# Patient Record
Sex: FEMALE | Race: WHITE | NOT HISPANIC OR LATINO | Employment: UNEMPLOYED | URBAN - METROPOLITAN AREA
[De-identification: names, ages, dates, MRNs, and addresses within clinical notes are randomized per-mention and may not be internally consistent; named-entity substitution may affect disease eponyms.]

---

## 2017-10-05 ENCOUNTER — OFFICE VISIT (OUTPATIENT)
Dept: PHYSICAL MEDICINE AND REHAB | Facility: CLINIC | Age: 42
End: 2017-10-05
Payer: COMMERCIAL

## 2017-10-05 ENCOUNTER — HOSPITAL ENCOUNTER (OUTPATIENT)
Dept: RADIOLOGY | Facility: HOSPITAL | Age: 42
Discharge: HOME | End: 2017-10-05
Attending: PHYSICAL MEDICINE & REHABILITATION
Payer: COMMERCIAL

## 2017-10-05 VITALS
WEIGHT: 169.75 LBS | SYSTOLIC BLOOD PRESSURE: 114 MMHG | HEIGHT: 62 IN | DIASTOLIC BLOOD PRESSURE: 79 MMHG | HEART RATE: 75 BPM | BODY MASS INDEX: 31.24 KG/M2

## 2017-10-05 DIAGNOSIS — M25.571 RIGHT ANKLE PAIN, UNSPECIFIED CHRONICITY: Primary | ICD-10-CM

## 2017-10-05 DIAGNOSIS — M25.571 RIGHT ANKLE PAIN, UNSPECIFIED CHRONICITY: ICD-10-CM

## 2017-10-05 DIAGNOSIS — G89.29 CHRONIC PAIN OF RIGHT ANKLE: Primary | ICD-10-CM

## 2017-10-05 DIAGNOSIS — M25.371 RIGHT ANKLE INSTABILITY: ICD-10-CM

## 2017-10-05 DIAGNOSIS — S93.401S SPRAIN OF RIGHT ANKLE, UNSPECIFIED LIGAMENT, SEQUELA: ICD-10-CM

## 2017-10-05 DIAGNOSIS — M25.571 CHRONIC PAIN OF RIGHT ANKLE: Primary | ICD-10-CM

## 2017-10-05 PROCEDURE — 73610 X-RAY EXAM OF ANKLE: CPT | Mod: TC,PO,RT

## 2017-10-05 PROCEDURE — 99203 OFFICE O/P NEW LOW 30 MIN: CPT | Mod: S$GLB,,, | Performed by: PHYSICAL MEDICINE & REHABILITATION

## 2017-10-05 PROCEDURE — 99999 PR PBB SHADOW E&M-EST. PATIENT-LVL III: CPT | Mod: PBBFAC,,, | Performed by: PHYSICAL MEDICINE & REHABILITATION

## 2017-10-05 PROCEDURE — 73610 X-RAY EXAM OF ANKLE: CPT | Mod: 26,RT,, | Performed by: RADIOLOGY

## 2017-10-05 NOTE — PROGRESS NOTES
"[facility]    CHIEF COMPLAINT:   Chief Complaint   Patient presents with    Ankle Pain     right ankle pain     HISTORY OF PRESENT ILLNESS: Marin Gomes is a 42 y.o. female who presents for evaluation of right ankle pain. She reports stepping on an uneven surface on September 13, 2017 and "spraining" her right ankle. She had pain along the lateral aspect and swelling. She continues to have mild pain on the lateral aspect of the ankle primarily with walking or wearing high heels. The ankle continues to feel unsteady and she is afraid it will give out. The right ankle is still somewhat swollen. Overall, she does feel like her ankle pain is improving. She has no pain at rest. Of note, she had a similar episode about 10 years ago which resolved on its own.  She reports that overall she is improving.  She denies any mechanical symptoms such as grinding or snapping.    Review of Systems   Constitutional: Negative for fever.   HENT: Negative for drooling.    Eyes: Negative for discharge.   Respiratory: Negative for choking.    Cardiovascular: Negative for chest pain.   Genitourinary: Negative for flank pain.   Skin: Negative for wound.   Allergic/Immunologic: Negative for immunocompromised state.   Neurological: Negative for tremors and syncope.   Psychiatric/Behavioral: Negative for behavioral problems.     Past Medical History: History reviewed. No pertinent past medical history.    Past Surgical History: History reviewed. No pertinent surgical history.    Family History: History reviewed. No pertinent family history.    Medications:   No current outpatient prescriptions on file prior to visit.     No current facility-administered medications on file prior to visit.        Allergies: Review of patient's allergies indicates:  Allergies not on file    Social History:   Social History     Social History    Marital status:      Spouse name: N/A    Number of children: N/A    Years of " "education: N/A     Social History Main Topics    Smoking status: Never Smoker    Smokeless tobacco: Never Used    Alcohol use None    Drug use: Unknown    Sexual activity: Not Asked     Other Topics Concern    None     Social History Narrative    None       PHYSICAL EXAMINATION:   General    Vitals:    10/05/17 1259   BP: 114/79   Pulse: 75   Weight: 77 kg (169 lb 12.1 oz)   Height: 5' 2" (1.575 m)     Constitutional: Oriented to person, place, and time. No apparent distress. Appears well-developed and well-nourished. Pleasant.  HENT:   Head: Normocephalic and atraumatic.   Eyes: Right eye exhibits no discharge. Left eye exhibits no discharge. No scleral icterus.   Pulmonary/Chest: Effort normal. No respiratory distress.   Abdominal: There is no guarding.   Neurological: Alert and oriented to person, place, and time.   Psychiatric: Behavior is normal.   Right Ankle Exam   Swelling: mild    Tenderness   The patient is experiencing tenderness in the ATF.        Range of Motion   Dorsiflexion: 20   Plantar flexion: 40   Inversion: normal   Eversion: normal     Muscle Strength   Dorsiflexion:  5/5  Plantar flexion:  5/5  Posterior tibial:  5/5  Peroneal muscle:  5/5    Tests   Anterior drawer: negative  Varus tilt: physiological laxity    Other   Erythema: absent  Scars: absent  Sensation: normal  Pulse: present       Left Ankle Exam   Swelling: none    Tenderness   The patient is experiencing no tenderness.     Range of Motion   Dorsiflexion: normal   Plantar flexion: normal   Inversion: normal   Eversion: normal     Muscle Strength   Dorsiflexion:  5/5   Plantar flexion:  5/5   Posterior tibial:  5/5  Peroneal muscle:  5/5    Tests   Anterior drawer: negative    Other   Erythema: absent  Scars: absent  Sensation: normal  Pulse: present        INSPECTION: There is no ecchymoses, erythema or gross deformity. There is some swelling on the lateral right ankle.  GAIT/DYNAMIC: Non-antalgic without the use of an " assistive device.    Imaging  Xray of the right ankle 10/5/17: No fracture, subluxation, or other significant abnormality is identified.  Joints and soft tissues are unremarkable.    Diagnostic ultrasound of the right ankle today shows intact ATFL with small hypoechoic fluid collection deep to the ligament.  There was no increased hyperemia seen within the soft tissue within the sinus tarsi.  There was no tibiotalar joint effusion.    Data Reviewed: X-ray, ultrasound    Supportive Actions: Independent visualization of images or test specimens    ASSESSMENT:   1. Chronic pain of right ankle    2. Right ankle instability      PLAN:     1. Time was spent reviewing the above diagnosis in depth with Marin today, including acute management and rehabilitation.     2.  It appears she is recovering from acute ankle sprain.  The ankle is intact and stable on today's exam.  She has minimal tenderness to palpation.  X-rays are negative and ultrasound failed to show any significant pathology.  I recommended conservative care for her issue.  She notes a history of prior ankle sprains and is worried about this occurring again.  I recommended formal physical therapy to work on ankle stabilization and transitioning to a home exercise program for long-term maintenance.  Her right ankle will likely continue to improve over the next 4-5 weeks.  We will help facilitate her getting set up with formal therapy.     3.  RTC in 4-5 weeks should ankle fail to improve.     The above note was completed, in part, with the aid of Dragon dictation software/hardware. Translation errors may be present.

## 2017-11-02 ENCOUNTER — CLINICAL SUPPORT (OUTPATIENT)
Dept: REHABILITATION | Facility: HOSPITAL | Age: 42
End: 2017-11-02
Attending: PHYSICAL MEDICINE & REHABILITATION
Payer: COMMERCIAL

## 2017-11-02 DIAGNOSIS — M25.571 ACUTE RIGHT ANKLE PAIN: Primary | ICD-10-CM

## 2017-11-02 PROCEDURE — 97161 PT EVAL LOW COMPLEX 20 MIN: CPT | Mod: PN | Performed by: PHYSICAL THERAPIST

## 2017-11-02 PROCEDURE — 97110 THERAPEUTIC EXERCISES: CPT | Mod: PN | Performed by: PHYSICAL THERAPIST

## 2017-11-02 NOTE — PROGRESS NOTES
[facility]   PATIENT EVALUATION    Date: 11/02/2017  Start Time: 9:00  Stop Time: 10:00    Patient Name: Marin Gomes  Clinic Number: [id number]  Age: 42 y.o.  Gender: female    Diagnosis: Right ankle instability  Encounter Diagnosis   Name Primary?    Acute right ankle pain Yes       Referring Physician: Tate Looney, *  Treatment Orders: PT Eval and Treat      History     No past medical history on file.    No current outpatient prescriptions on file.     No current facility-administered medications for this visit.        Review of patient's allergies indicates:  No Known Allergies      Subjective     History of Present Condition: Pt reports spraining right ankle 10 years ago. Just recently on Sept. 13, I sprained the ankle again and had to cancel a flight. I never had PT before.  I am worried about spraining the ankle again. Pt states this time I was not watching where I was going and tripped over some rocks. It has been relatively painful ever since.     PMHx: Hoshimoto's    Onset Date: 9/13/17  Date of Surgery: n/a  Precautions: none    Mechanism of Injury: sudden    Pain Location: right ankle pain  Pain Description: Aching and Dull  Current Pain: 0/10  Least Pain: 0/10  Worst Pain: 8/10  Aggravating Factors: prolonged walking, prolonged standing  Relieving Factors: rest, ice      Prior Therapy: none    Occupation: stay at home mom    Sports/Recreational Activities: family  Extremity Dominance: R    Prior Level of Function: Independent  Functional Deficits Leading to Referral/Nature of Injury: none  Patient Therapy Goals: To get back to normal function without pain  Cultural/Environmental/Spiritual Barriers to Treatment or Learning: none      Objective       Posture: upper trunk kyphosis  Gait: bilateral  Hip drop, bilateral foot pronation and rearfoot valgus R>L      Palpation: TTP over right CFL, ATFL    Range of Motion   Left ankle:  PF: 60  DF: 13  IV: 26  EV:  20    Right ankle:  PF: 43  DF: 14  IV: 20  EV: 13    Joint Mobility: hypomobile talocrural posterior mobs  Flexibility: increased tension right gastroc/soleus/hamstring    Strength   Left ankle:  PF: 5/5  DF: 5/5  IV: 5/5  EV: 5/5    Right ankle:  PF: 4+/5  DF: 4+/5  IV: 4/5  EV: 4/5      Special Tests: Right: + anterior drawer, + inversion stress, - squeeze    Other:   Bilateral hip abd: 3/5    Treatment:   SL hip abd 3x10      Functional Limitations Reports - G Codes  Category: Mobility  Tool: FOTO  Score: 49    History  Co-morbidities and personal factors that may impact the plan of care Low    Stable Clinical Presentation   Co-morbidities:       Personal Factors:     Body regions    Body systems    Activity Limitations    Participation Restrictons   No personal factors and/ or  comorbidites          Address 1-2 elements:           Assessment     This is a 42 y.o. female referred to outpatient physical therapy and presents with a medical diagnosis of right ankle pain and demonstrates limitations as described in the problem list. Pt demonstrates good rehab potential. Pt will benefit from physcial therapy services in order to maximize pain free and/or functional use of right LE/ankle. The following goals were discussed with the patient and patient is in agreement with them as to be addressed in the treatment plan. Pt was given a HEP consisting of functional hip/ankle PREs Pt verbally understood the instructions as they were given and demonstrated proper form and technique during therapy. Pt was advised to perform these exercises free of pain, and to stop performing them if pain occurs.     Medical necessity is demonstrated by the following problem list:   - Pain limits function of effected part for all activities  - Unable to participate in daily activities   - Requires skilled supervision to complete and progress HEP  - Fall risk - impaired balance   - Continued inability to participate in vocational  pursuits          Short Term Goals (6  Weeks):  - Pt will increase ROM of right ankle = left.   - Pt will increase strength of right ankle = grossly 5/5  - Decrease Pain to 0/10 with gait.   - Pt to self correct posture independently.  - Pt independent with HEP with progressions.     Long Term Goals (12 Weeks):  - Pt with negative anterior drawer on right ankle.  - Pt will increase strength of bilateral hip abd = 5/5  - Decrease Pain to 0/10 with ADLs.  - Pt to return to recreation without pain or instability.       Plan     Pt will be treated by physical therapy 1-2  times a week for 12  weeks for manual therapy, therapeutic exercise, home exercise program, patient education, and modalities PRN to achieve established goals. Marin may at times be seen by a PTA as part of the Rehab Team.     Therapist: DOREEN CRUZ, PT    I CERTIFY THE NEED FOR THESE SERVICES FURNISHED UNDER THIS PLAN OF TREATMENT AND WHILE UNDER MY CARE    Physician's comments: ____________________________________________________________________________________________________________________________________________    Physician's Name: ___________________________________

## 2017-11-09 ENCOUNTER — CLINICAL SUPPORT (OUTPATIENT)
Dept: REHABILITATION | Facility: HOSPITAL | Age: 42
End: 2017-11-09
Attending: PHYSICAL MEDICINE & REHABILITATION
Payer: COMMERCIAL

## 2017-11-09 DIAGNOSIS — M25.571 ACUTE RIGHT ANKLE PAIN: Primary | ICD-10-CM

## 2017-11-09 PROCEDURE — 97110 THERAPEUTIC EXERCISES: CPT | Mod: PN | Performed by: PHYSICAL THERAPIST

## 2017-11-09 PROCEDURE — 97140 MANUAL THERAPY 1/> REGIONS: CPT | Mod: PN | Performed by: PHYSICAL THERAPIST

## 2017-11-09 NOTE — PLAN OF CARE
Physical Therapy Daily Note     Date: 11/09/2017  Name: Marin Gomes  Clinic Number: [id number]  Diagnosis:   Encounter Diagnosis   Name Primary?    Acute right ankle pain Yes     Physician: Tate Looney, *    Evaluation Date: 11/2/17  Date of Surgery: n/a  Visit #: 2   Start Time:  9:00  Stop Time:  10:00  Total Treatment Time: 60    Precautions: none    Subjective     Pt reports the ankle is sore from doing the HEP.     Pain: 2/10    Objective     Measurements taken: none    Patient received individual therapy to increase strength, endurance and ROM with activities as follows:     Marin received therapeutic exercises to develop strength, endurance and ROM for 30 minutes including:   Clamshells 3x10  Side steps x 2 laps  Ankle 4 way x 30  Ankle abcs x 1  6 in step ups 3x10  Shuttle DL 3c 3x19        Marin received the following manual therapy techniques: Joint mobilizations and Soft tissue Mobilization were applied to the: right ankle for 10 minutes.   TFM over ATFL  Poster talocrural mobs        Written Home Exercises Provided: updated as per therex list  Pt demo good understanding of the education provided. Marin demonstrated good return demonstration of activities.     Education provided:  Marin verbalized good understanding of education provided.   No spiritual or educational barriers to learning identified.    Assessment     Improving right ankle functional mobility and SLB. Continue to progress right ankle stabilization and functional hip strength.     This is a 42 y.o. female referred to outpatient physical therapy and presents with a medical diagnosis of right ankle instability and demonstrates limitations as described in the problem list Pt prognosis is Good. Pt will continue to benefit from skilled outpatient physical therapy to address the deficits listed below in the problem list, provide pt/family education and to maximize pt's  level of independence in the home and community environment.    Will the patient continue to benefit from skilled PT intervention? yes        Medical necessity is demonstrated by:   - Pain limits function of effected part for all activities  - Unable to participate in daily activities   - Requires skilled supervision to complete and progress HEP  - Fall risk - impaired balance   - Continued inability to participate in vocational pursuits    Short Term Goals (6  Weeks):  - Pt will increase ROM of right ankle = left.   - Pt will increase strength of right ankle = grossly 5/5  - Decrease Pain to 0/10 with gait.   - Pt to self correct posture independently.  - Pt independent with HEP with progressions.      Long Term Goals (12 Weeks):  - Pt with negative anterior drawer on right ankle.  - Pt will increase strength of bilateral hip abd = 5/5  - Decrease Pain to 0/10 with ADLs.  - Pt to return to recreation without pain or instability.        Plan   Continue with established Plan of Care towards PT goals.      Therapist: DOREEN CRUZ, PT

## 2017-11-16 ENCOUNTER — CLINICAL SUPPORT (OUTPATIENT)
Dept: REHABILITATION | Facility: HOSPITAL | Age: 42
End: 2017-11-16
Attending: PHYSICAL MEDICINE & REHABILITATION
Payer: COMMERCIAL

## 2017-11-16 DIAGNOSIS — M25.571 ACUTE RIGHT ANKLE PAIN: Primary | ICD-10-CM

## 2017-11-16 PROCEDURE — 97110 THERAPEUTIC EXERCISES: CPT | Mod: PN | Performed by: PHYSICAL THERAPIST

## 2017-11-17 NOTE — PLAN OF CARE
Physical Therapy Daily Note     Date: 11/17/2017  Name: Mairn Gomes  Clinic Number: [id number]  Diagnosis:   Encounter Diagnosis   Name Primary?    Acute right ankle pain Yes     Physician: Tate Looney, *    Evaluation Date: 11/2/17  Date of Surgery: n/a  Visit #: 3  Start Time:  1:00  Stop Time:  2:00  Total Treatment Time: 60    Precautions: none    Subjective     Pt reports the ankle is feeling ok. It was sore from last time.     Pain: 2/10    Objective     Measurements taken: none    Patient received individual therapy to increase strength, endurance and ROM with activities as follows:     Marin received therapeutic exercises to develop strength, endurance and ROM for 40 minutes including:   Clamshells 3x10  Side steps x 2 laps  Ankle 4 way x 30  Ankle abcs x 1  6 in step ups 3x10  6 in step downs 3x10  Shuttle DL 3c 3x10  Shuttle SL 2c 3x10        Marin received the following manual therapy techniques: Joint mobilizations and Soft tissue Mobilization were applied to the: right ankle for 5 minutes.   TFM over ATFL  Poster talocrural mobs        Written Home Exercises Provided: updated as per therex list  Pt demo good understanding of the education provided. Marin demonstrated good return demonstration of activities.     Education provided:  Marin verbalized good understanding of education provided.   No spiritual or educational barriers to learning identified.    Assessment     Improving functional hip strength and single leg stability. Continue to progress as tolerated.     This is a 42 y.o. female referred to outpatient physical therapy and presents with a medical diagnosis of right ankle instability and demonstrates limitations as described in the problem list Pt prognosis is Good. Pt will continue to benefit from skilled outpatient physical therapy to address the deficits listed below in the problem list, provide pt/family education  and to maximize pt's level of independence in the home and community environment.    Will the patient continue to benefit from skilled PT intervention? yes        Medical necessity is demonstrated by:   - Pain limits function of effected part for all activities  - Unable to participate in daily activities   - Requires skilled supervision to complete and progress HEP  - Fall risk - impaired balance   - Continued inability to participate in vocational pursuits    Short Term Goals (6  Weeks):  - Pt will increase ROM of right ankle = left.   - Pt will increase strength of right ankle = grossly 5/5  - Decrease Pain to 0/10 with gait.   - Pt to self correct posture independently.  - Pt independent with HEP with progressions.      Long Term Goals (12 Weeks):  - Pt with negative anterior drawer on right ankle.  - Pt will increase strength of bilateral hip abd = 5/5  - Decrease Pain to 0/10 with ADLs.  - Pt to return to recreation without pain or instability.        Plan   Continue with established Plan of Care towards PT goals.      Therapist: DOREEN CRUZ, PT

## 2017-11-30 ENCOUNTER — CLINICAL SUPPORT (OUTPATIENT)
Dept: REHABILITATION | Facility: HOSPITAL | Age: 42
End: 2017-11-30
Attending: PHYSICAL MEDICINE & REHABILITATION
Payer: COMMERCIAL

## 2017-11-30 DIAGNOSIS — M25.571 ACUTE RIGHT ANKLE PAIN: Primary | ICD-10-CM

## 2017-11-30 PROCEDURE — 97110 THERAPEUTIC EXERCISES: CPT | Mod: PN | Performed by: PHYSICAL THERAPIST

## 2017-11-30 NOTE — PLAN OF CARE
Physical Therapy Daily Note     Date: 11/30/2017  Name: Marin Gomes  Clinic Number: [id number]  Diagnosis:   Encounter Diagnosis   Name Primary?    Acute right ankle pain Yes     Physician: Tate Looney, *    Evaluation Date: 11/2/17  Date of Surgery: n/a  Visit #: 4  Start Time:  9:00  Stop Time:  10:00  Total Treatment Time: 60    Precautions: none    Subjective     Pt reports the ankle is  Doing better. Although I did bump it the other day - it is sore from that.      Pain: 2/10    Objective     Measurements taken: none    Patient received individual therapy to increase strength, endurance and ROM with activities as follows:     Marin received therapeutic exercises to develop strength, endurance and ROM for 40 minutes including:   Clamshells 3x10  Side steps x 2 laps  Ankle wall taps x30  Calf raises x 30  6 in step ups 3x10  6 in step downs 3x10  Shuttle DL 3c 3x10  Shuttle SL 2c 3x10        Marin received the following manual therapy techniques: Joint mobilizations and Soft tissue Mobilization were applied to the: right ankle for 5 minutes.   TFM over ATFL  Poster talocrural mobs        Written Home Exercises Provided: updated as per therex list  Pt demo good understanding of the education provided. Marin demonstrated good return demonstration of activities.     Education provided:  Marin verbalized good understanding of education provided.   No spiritual or educational barriers to learning identified.    Assessment     Single leg ankle stability and functional hip strength continue to improve. Continue to progress closed chain ankle stability.     This is a 42 y.o. female referred to outpatient physical therapy and presents with a medical diagnosis of right ankle instability and demonstrates limitations as described in the problem list Pt prognosis is Good. Pt will continue to benefit from skilled outpatient physical therapy to address  the deficits listed below in the problem list, provide pt/family education and to maximize pt's level of independence in the home and community environment.    Will the patient continue to benefit from skilled PT intervention? yes        Medical necessity is demonstrated by:   - Pain limits function of effected part for all activities  - Unable to participate in daily activities   - Requires skilled supervision to complete and progress HEP  - Fall risk - impaired balance   - Continued inability to participate in vocational pursuits    Short Term Goals (6  Weeks):  - Pt will increase ROM of right ankle = left.   - Pt will increase strength of right ankle = grossly 5/5  - Decrease Pain to 0/10 with gait.   - Pt to self correct posture independently.  - Pt independent with HEP with progressions.      Long Term Goals (12 Weeks):  - Pt with negative anterior drawer on right ankle.  - Pt will increase strength of bilateral hip abd = 5/5  - Decrease Pain to 0/10 with ADLs.  - Pt to return to recreation without pain or instability.        Plan   Continue with established Plan of Care towards PT goals.      Therapist: DOREEN CRUZ, PT

## 2017-12-14 ENCOUNTER — CLINICAL SUPPORT (OUTPATIENT)
Dept: REHABILITATION | Facility: HOSPITAL | Age: 42
End: 2017-12-14
Attending: PHYSICAL MEDICINE & REHABILITATION
Payer: COMMERCIAL

## 2017-12-14 DIAGNOSIS — M25.571 ACUTE RIGHT ANKLE PAIN: Primary | ICD-10-CM

## 2017-12-14 PROCEDURE — 97110 THERAPEUTIC EXERCISES: CPT | Mod: PN | Performed by: PHYSICAL THERAPIST

## 2017-12-14 NOTE — PLAN OF CARE
Physical Therapy Daily Note     Date: 12/14/2017  Name: Marin Gomes  Clinic Number: [id number]  Diagnosis:   Encounter Diagnosis   Name Primary?    Acute right ankle pain Yes     Physician: Tate Looney, *    Evaluation Date: 11/2/17  Date of Surgery: n/a  Visit #: 5  Start Time:  9:00  Stop Time:  10:00  Total Treatment Time: 60    Precautions: none    Subjective     Pt reports the ankle is doing much better.      Pain: 0/10    Objective     Measurements taken: none    Patient received individual therapy to increase strength, endurance and ROM with activities as follows:     Marin received therapeutic exercises to develop strength, endurance and ROM for 40 minutes including:   Clamshells 3x10  Side steps x 2 laps  Ankle wall taps x30  Calf raises x 30  6 in step ups 3x10  6 in step downs 3x10  Shuttle DL 3c 3x10  Shuttle SL 2c 3x10        Marin received the following manual therapy techniques: Joint mobilizations and Soft tissue Mobilization were applied to the: right ankle for 5 minutes.   TFM over ATFL  Poster talocrural mobs        Written Home Exercises Provided: updated as per therex list  Pt demo good understanding of the education provided. Marin demonstrated good return demonstration of activities.     Education provided:  Marin verbalized good understanding of education provided.   No spiritual or educational barriers to learning identified.    Assessment     Ankle stability and SLB continue to improve with rx. Prepare for DC if asymptomatic in next 1-2 visits.     This is a 42 y.o. female referred to outpatient physical therapy and presents with a medical diagnosis of right ankle instability and demonstrates limitations as described in the problem list Pt prognosis is Good. Pt will continue to benefit from skilled outpatient physical therapy to address the deficits listed below in the problem list, provide pt/family education and to  maximize pt's level of independence in the home and community environment.    Will the patient continue to benefit from skilled PT intervention? yes        Medical necessity is demonstrated by:   - Pain limits function of effected part for all activities  - Unable to participate in daily activities   - Requires skilled supervision to complete and progress HEP  - Fall risk - impaired balance   - Continued inability to participate in vocational pursuits    Short Term Goals (6  Weeks):  - Pt will increase ROM of right ankle = left.   - Pt will increase strength of right ankle = grossly 5/5  - Decrease Pain to 0/10 with gait.   - Pt to self correct posture independently.  - Pt independent with HEP with progressions.      Long Term Goals (12 Weeks):  - Pt with negative anterior drawer on right ankle.  - Pt will increase strength of bilateral hip abd = 5/5  - Decrease Pain to 0/10 with ADLs.  - Pt to return to recreation without pain or instability.        Plan   Continue with established Plan of Care towards PT goals.      Therapist: DOREEN CRUZ, PT

## 2017-12-21 ENCOUNTER — CLINICAL SUPPORT (OUTPATIENT)
Dept: REHABILITATION | Facility: HOSPITAL | Age: 42
End: 2017-12-21
Attending: PHYSICAL MEDICINE & REHABILITATION
Payer: COMMERCIAL

## 2017-12-21 DIAGNOSIS — M25.571 ACUTE RIGHT ANKLE PAIN: Primary | ICD-10-CM

## 2017-12-21 PROCEDURE — 97110 THERAPEUTIC EXERCISES: CPT | Mod: PN | Performed by: PHYSICAL THERAPIST

## 2017-12-21 NOTE — PLAN OF CARE
Physical Therapy Daily Note/DC Summary     Date: 12/21/2017  Name: Marin Gomes  Clinic Number: [id number]  Diagnosis:   Encounter Diagnosis   Name Primary?    Acute right ankle pain Yes     Physician: Tate Looney, *    Evaluation Date: 11/2/17  Date of Surgery: n/a  Visit #: 6  Start Time:  8:00  Stop Time:  8:30  Total Treatment Time: 30    Precautions: none    Subjective     Pt reports the ankle is doing greatr.      Pain: 0/10    Objective     Measurements taken:   Bilateral ankle ROM = WFL and painfree  Pt with 5/5 hip abd and right ankle strength.     Patient received individual therapy to increase strength, endurance and ROM with activities as follows:     Marin received therapeutic exercises to develop strength, endurance and ROM for 25 minutes including:   Clamshells 3x10  Side steps x 2 laps  Ankle wall taps x30  Calf raises x 30          Marin received the following manual therapy techniques: Joint mobilizations and Soft tissue Mobilization were applied to the: right ankle for 5 minutes.   TFM over ATFL  Poster talocrural mobs        Written Home Exercises Provided: updated as per therex list  Pt demo good understanding of the education provided. Marin demonstrated good return demonstration of activities.     Education provided:  Marin verbalized good understanding of education provided.   No spiritual or educational barriers to learning identified.    Assessment     Excellent single leg stability. Pt wishes to DC secondary to independence with HEP and no symptoms.     This is a 42 y.o. female referred to outpatient physical therapy and presents with a medical diagnosis of right ankle instability and demonstrates limitations as described in the problem list Pt prognosis is Good. Pt will continue to benefit from skilled outpatient physical therapy to address the deficits listed below in the problem list, provide pt/family education  and to maximize pt's level of independence in the home and community environment.    Will the patient continue to benefit from skilled PT intervention? yes        Medical necessity is demonstrated by:   - Pain limits function of effected part for all activities  - Unable to participate in daily activities   - Requires skilled supervision to complete and progress HEP  - Fall risk - impaired balance   - Continued inability to participate in vocational pursuits    Short Term Goals (6  Weeks): All goals met  - Pt will increase ROM of right ankle = left.   - Pt will increase strength of right ankle = grossly 5/5  - Decrease Pain to 0/10 with gait.   - Pt to self correct posture independently.  - Pt independent with HEP with progressions.      Long Term Goals (12 Weeks):All goals met  - Pt with negative anterior drawer on right ankle.  - Pt will increase strength of bilateral hip abd = 5/5  - Decrease Pain to 0/10 with ADLs.  - Pt to return to recreation without pain or instability.        Plan   DC with HEP.     Therapist: DOREEN CRUZ, PT

## 2019-07-12 ENCOUNTER — OFFICE VISIT (OUTPATIENT)
Dept: URGENT CARE | Facility: CLINIC | Age: 44
End: 2019-07-12

## 2019-07-12 VITALS
HEIGHT: 62 IN | TEMPERATURE: 98 F | BODY MASS INDEX: 31.1 KG/M2 | WEIGHT: 169 LBS | RESPIRATION RATE: 16 BRPM | OXYGEN SATURATION: 97 % | HEART RATE: 84 BPM | SYSTOLIC BLOOD PRESSURE: 125 MMHG | DIASTOLIC BLOOD PRESSURE: 90 MMHG

## 2019-07-12 DIAGNOSIS — J02.0 STREP PHARYNGITIS: Primary | ICD-10-CM

## 2019-07-12 DIAGNOSIS — J02.9 SORE THROAT: ICD-10-CM

## 2019-07-12 LAB
CTP QC/QA: YES
S PYO RRNA THROAT QL PROBE: POSITIVE

## 2019-07-12 PROCEDURE — 96372 PR INJECTION,THERAP/PROPH/DIAG2ST, IM OR SUBCUT: ICD-10-PCS | Mod: S$GLB,,, | Performed by: PHYSICIAN ASSISTANT

## 2019-07-12 PROCEDURE — 96372 THER/PROPH/DIAG INJ SC/IM: CPT | Mod: S$GLB,,, | Performed by: PHYSICIAN ASSISTANT

## 2019-07-12 PROCEDURE — 99203 PR OFFICE/OUTPT VISIT, NEW, LEVL III, 30-44 MIN: ICD-10-PCS | Mod: 25,S$GLB,, | Performed by: PHYSICIAN ASSISTANT

## 2019-07-12 PROCEDURE — 87880 STREP A ASSAY W/OPTIC: CPT | Mod: QW,S$GLB,, | Performed by: PHYSICIAN ASSISTANT

## 2019-07-12 PROCEDURE — 99203 OFFICE O/P NEW LOW 30 MIN: CPT | Mod: 25,S$GLB,, | Performed by: PHYSICIAN ASSISTANT

## 2019-07-12 PROCEDURE — 87880 POCT RAPID STREP A: ICD-10-PCS | Mod: QW,S$GLB,, | Performed by: PHYSICIAN ASSISTANT

## 2019-07-12 RX ORDER — IBUPROFEN 200 MG
200 TABLET ORAL EVERY 6 HOURS PRN
COMMUNITY

## 2019-07-12 RX ORDER — KETOROLAC TROMETHAMINE 30 MG/ML
30 INJECTION, SOLUTION INTRAMUSCULAR; INTRAVENOUS
Status: COMPLETED | OUTPATIENT
Start: 2019-07-12 | End: 2019-07-12

## 2019-07-12 RX ORDER — AZITHROMYCIN 250 MG/1
TABLET, FILM COATED ORAL
Qty: 6 TABLET | Refills: 0 | Status: SHIPPED | OUTPATIENT
Start: 2019-07-12

## 2019-07-12 RX ADMIN — KETOROLAC TROMETHAMINE 30 MG: 30 INJECTION, SOLUTION INTRAMUSCULAR; INTRAVENOUS at 01:07

## 2019-07-12 NOTE — PROGRESS NOTES
"Subjective:       Patient ID: Marin Gomes is a 44 y.o. female.    Vitals:  height is 5' 2" (1.575 m) and weight is 76.7 kg (169 lb). Her temperature is 97.7 °F (36.5 °C). Her blood pressure is 125/90 (abnormal) and her pulse is 84. Her respiration is 16 and oxygen saturation is 97%.     Chief Complaint: Sore Throat    Sore Throat    This is a new problem. The current episode started in the past 7 days. The problem has been unchanged. Pertinent negatives include no congestion, coughing, ear pain, shortness of breath, stridor or vomiting.       Constitution: Negative for chills, sweating, fatigue and fever.   HENT: Positive for sore throat. Negative for ear pain, congestion, sinus pain, sinus pressure and voice change.    Neck: Negative for painful lymph nodes.   Eyes: Negative for eye redness.   Respiratory: Negative for chest tightness, cough, sputum production, bloody sputum, COPD, shortness of breath, stridor, wheezing and asthma.    Gastrointestinal: Negative for nausea and vomiting.   Musculoskeletal: Negative for muscle ache.   Skin: Negative for rash.   Allergic/Immunologic: Negative for seasonal allergies and asthma.   Hematologic/Lymphatic: Negative for swollen lymph nodes.       Objective:      Physical Exam   Constitutional: She is oriented to person, place, and time. She appears well-developed and well-nourished. She is cooperative.  Non-toxic appearance. She does not appear ill. No distress.   HENT:   Head: Normocephalic and atraumatic.   Right Ear: Hearing, tympanic membrane, external ear and ear canal normal.   Left Ear: Hearing, tympanic membrane, external ear and ear canal normal.   Nose: Nose normal. No mucosal edema, rhinorrhea or nasal deformity. No epistaxis. Right sinus exhibits no maxillary sinus tenderness and no frontal sinus tenderness. Left sinus exhibits no maxillary sinus tenderness and no frontal sinus tenderness.   Mouth/Throat: Uvula is midline and mucous membranes are normal. " No trismus in the jaw. Normal dentition. No uvula swelling. Posterior oropharyngeal erythema present. Tonsils are 2+ on the right. Tonsils are 2+ on the left. No tonsillar exudate.   Eyes: Conjunctivae and lids are normal. No scleral icterus.   Sclera clear bilat   Neck: Trachea normal, full passive range of motion without pain and phonation normal. Neck supple.   Cardiovascular: Normal rate, regular rhythm, normal heart sounds, intact distal pulses and normal pulses.   Pulmonary/Chest: Effort normal and breath sounds normal. No respiratory distress.   Abdominal: Soft. Normal appearance and bowel sounds are normal. She exhibits no distension. There is no tenderness.   Musculoskeletal: Normal range of motion. She exhibits no edema or deformity.   Neurological: She is alert and oriented to person, place, and time. She exhibits normal muscle tone. Coordination normal.   Skin: Skin is warm, dry and intact. She is not diaphoretic. No pallor.   Psychiatric: She has a normal mood and affect. Her speech is normal and behavior is normal. Judgment and thought content normal. Cognition and memory are normal.   Nursing note and vitals reviewed.      Assessment:       1. Strep pharyngitis    2. Sore throat        Plan:         Strep pharyngitis    Sore throat  -     POCT rapid strep A (positive)    Other orders  -     azithromycin (Z-BELLA) 250 MG tablet; Take 2 tablets by mouth on day 1; Take 1 tablet by mouth on days 2-5  Dispense: 6 tablet; Refill: 0  -     ketorolac injection 30 mg    You must understand that you've received an Urgent Care treatment only and that you may be released before all your medical problems are known or treated. You, the patient, will arrange for follow up care as instructed.  Follow up with your PCP or specialty clinic as directed in the next 1-2 weeks if not improved or as needed.  You can call [phone] to schedule an appointment with the appropriate provider.  If your condition worsens we  recommend that you receive another evaluation at the emergency room immediately or contact your primary medical clinics after hours call service to discuss your concerns.  Please return here or go to the Emergency Department for any concerns or worsening of condition.

## 2019-07-12 NOTE — PATIENT INSTRUCTIONS

## 2019-07-15 ENCOUNTER — TELEPHONE (OUTPATIENT)
Dept: URGENT CARE | Facility: CLINIC | Age: 44
End: 2019-07-15

## 2020-08-07 ENCOUNTER — OFFICE VISIT (OUTPATIENT)
Dept: URGENT CARE | Facility: CLINIC | Age: 45
End: 2020-08-07
Payer: COMMERCIAL

## 2020-08-07 VITALS
RESPIRATION RATE: 16 BRPM | TEMPERATURE: 98 F | SYSTOLIC BLOOD PRESSURE: 114 MMHG | BODY MASS INDEX: 31.1 KG/M2 | WEIGHT: 169 LBS | DIASTOLIC BLOOD PRESSURE: 81 MMHG | HEART RATE: 97 BPM | OXYGEN SATURATION: 96 % | HEIGHT: 62 IN

## 2020-08-07 DIAGNOSIS — R21 RASH: Primary | ICD-10-CM

## 2020-08-07 PROCEDURE — 99214 OFFICE O/P EST MOD 30 MIN: CPT | Mod: S$GLB,,, | Performed by: PHYSICIAN ASSISTANT

## 2020-08-07 PROCEDURE — 99214 PR OFFICE/OUTPT VISIT, EST, LEVL IV, 30-39 MIN: ICD-10-PCS | Mod: S$GLB,,, | Performed by: PHYSICIAN ASSISTANT

## 2020-08-07 RX ORDER — TRIAMCINOLONE ACETONIDE 0.25 MG/G
CREAM TOPICAL 2 TIMES DAILY
Qty: 15 G | Refills: 0 | Status: SHIPPED | OUTPATIENT
Start: 2020-08-07 | End: 2020-08-17

## 2020-08-07 RX ORDER — DOXYCYCLINE 100 MG/1
100 CAPSULE ORAL 2 TIMES DAILY
Qty: 28 CAPSULE | Refills: 0 | Status: SHIPPED | OUTPATIENT
Start: 2020-08-07 | End: 2020-08-21

## 2020-08-07 NOTE — PROGRESS NOTES
"Subjective:       Patient ID: Marin Gomes is a 45 y.o. female.    Vitals:  height is 5' 2" (1.575 m) and weight is 76.7 kg (169 lb). Her temperature is 98.2 °F (36.8 °C). Her blood pressure is 114/81 and her pulse is 97. Her respiration is 16 and oxygen saturation is 96%.     Chief Complaint: Insect Bite    Pt reports "hiking and spending time in the woods". She noticed some itching and bites to bilateral knees. She reports "pulling little black things" out of the center of the bites.    Insect Bite  This is a new problem. The current episode started in the past 7 days. The problem occurs constantly. The problem has been gradually improving. Pertinent negatives include no abdominal pain, anorexia, arthralgias, change in bowel habit, chest pain, chills, congestion, coughing, diaphoresis, fatigue, fever, headaches, joint swelling, myalgias, nausea, neck pain, numbness, rash, sore throat, swollen glands, urinary symptoms, vertigo, visual change, vomiting or weakness. Treatments tried: steroid cream, antibiotic ointment OTC.       Constitution: Negative for chills, sweating, fatigue and fever.   HENT: Negative for congestion and sore throat.    Neck: Negative for neck pain and painful lymph nodes.   Cardiovascular: Negative for chest pain and leg swelling.   Eyes: Negative for double vision and blurred vision.   Respiratory: Negative for cough and shortness of breath.    Gastrointestinal: Negative for abdominal pain, nausea, vomiting and diarrhea.   Genitourinary: Negative for dysuria, frequency, urgency and history of kidney stones.   Musculoskeletal: Negative for joint pain, joint swelling, muscle cramps and muscle ache.   Skin: Positive for color change and erythema. Negative for pale, rash, bruising and abscess.   Allergic/Immunologic: Negative for seasonal allergies.   Neurological: Negative for dizziness, history of vertigo, light-headedness, passing out, headaches and numbness.   Hematologic/Lymphatic: " Negative for swollen lymph nodes.   Psychiatric/Behavioral: Negative for nervous/anxious, sleep disturbance and depression. The patient is not nervous/anxious.        Objective:      Physical Exam   Constitutional: She is oriented to person, place, and time. She appears well-developed.   HENT:   Head: Normocephalic and atraumatic. Head is without abrasion, without contusion and without laceration.   Ears:   Right Ear: External ear normal.   Left Ear: External ear normal.   Nose: Nose normal.   Mouth/Throat: Oropharynx is clear and moist and mucous membranes are normal.   Eyes: Pupils are equal, round, and reactive to light. Conjunctivae, EOM and lids are normal.   Neck: Trachea normal, full passive range of motion without pain and phonation normal. Neck supple.   Cardiovascular: Normal rate, regular rhythm and normal heart sounds.   Pulmonary/Chest: Effort normal and breath sounds normal. No stridor. No respiratory distress.   Musculoskeletal: Normal range of motion.   Neurological: She is alert and oriented to person, place, and time.   Skin: Skin is warm, dry, intact and rash. Capillary refill takes less than 2 seconds. Lesions:  erythemaabrasion, burn, bruising and ecchymosis     Psychiatric: Her speech is normal and behavior is normal. Judgment and thought content normal.   Nursing note and vitals reviewed.        Assessment:       1. Rash        Plan:       All hx was provided by the pt or available as part of established EMR. The pt past medical hx, family hx, social hx, and current medications were reviewed. Pt to follow up with [facility] if no improvement or for any concern, and seek treatment in an ER for worsening. Tx options discussed. Pt voiced understanding of all discussed, and agreed with decision making.    Rash  -     triamcinolone acetonide 0.025% (KENALOG) 0.025 % cream; Apply topically 2 (two) times daily. for 10 days  Dispense: 15 g; Refill: 0  -     doxycycline (MONODOX) 100 MG capsule; Take 1  "capsule (100 mg total) by mouth 2 (two) times daily. for 14 days  Dispense: 28 capsule; Refill: 0      Patient Instructions     · Follow up with your primary care if symptoms do not improve, or you may return here at any time.  · If you were referred to a specialist, please follow up with that specialty.  · If you were prescribed antibiotics, please take them to completion.  · If you were prescribed a narcotic or any medication with sedative effects, do not drive or operate heavy equipment or machinery while taking these medications.  · You must understand that you have received treatment at an Urgent Care facility only, and that you may be released before all of your medical problems are known or treated. Urgent Care facilities are not equipped to handle life threatening emergencies. It is recommended that you seek care at an Emergency Department for further evaluation of worsening or concerning symptoms, or possibly life threatening conditions as discussed.                                        If you  smoke, please stop smoking               PLEASE PRACTICE SOCIAL DISTANCING        Tick Bites  Ticks are small arachnids that feed on the blood of rodents, rabbits, birds, deer, dogs, and people. A tick bite may cause a reaction like a spider bite. You may have redness, itching, and slight swelling at the site. Sometimes you may have no reaction where the tick bit you.  Ticks may gorge themselves for days before you find and remove them. The bites themselves aren't cause for concern. But ticks can carry and pass on illnesses such as Lyme disease and [place] spotted fever. Both diseases begin with a rash and symptoms similar to the flu. In advanced stages, these diseases can be quite serious.     A "bull's eye" rash is a common symptom of Lyme disease.   When to go to the emergency department (ED)  Not all ticks carry disease. And a tick must stay attached for at least 24 hours to infect you. If you find a " tick, don't panic. Try to carefully remove it with tweezers. Grasp the tick near its head and pull without twisting. If you can't easily dislodge the tick or if you leave the head in your skin, get medical care right away.  What to expect in the ED  · The tick or any parts of the tick will be removed and the bite will be cleaned.  · To prevent disease, you may be given antibiotics. Both Lyme disease and [place] spotted fever respond quickly to these medicines.  · You may be asked to see your healthcare provider for a blood test to check for Lyme disease.  Follow-up care  Some [facility] and [facility] have services that test ticks for Lyme disease and other diseases. Check with your local officials to see if this service is available in your area.  If you remove a tick yourself, watch for signs of a tick-borne illness. Symptoms may show up within a few days or weeks after a bite. Call your healthcare provider if you notice any of the following:  · Rash. The rash may spread outward in a ring from a hard white lump. Or it may move up your arms and legs to your chest.  · Chills and fever  · Body aches and joint pain  · Severe headache  Date Last Reviewed: 12/1/2016  © 1510-0453 [employer]. [address] [place] [address], [place] [address]. All rights reserved. This information is not intended as a substitute for professional medical care. Always follow your healthcare professional's instructions.

## 2020-08-07 NOTE — PATIENT INSTRUCTIONS
"· Follow up with your primary care if symptoms do not improve, or you may return here at any time.  · If you were referred to a specialist, please follow up with that specialty.  · If you were prescribed antibiotics, please take them to completion.  · If you were prescribed a narcotic or any medication with sedative effects, do not drive or operate heavy equipment or machinery while taking these medications.  · You must understand that you have received treatment at an Urgent Care facility only, and that you may be released before all of your medical problems are known or treated. Urgent Care facilities are not equipped to handle life threatening emergencies. It is recommended that you seek care at an Emergency Department for further evaluation of worsening or concerning symptoms, or possibly life threatening conditions as discussed.                                        If you  smoke, please stop smoking               PLEASE PRACTICE SOCIAL DISTANCING        Tick Bites  Ticks are small arachnids that feed on the blood of rodents, rabbits, birds, deer, dogs, and people. A tick bite may cause a reaction like a spider bite. You may have redness, itching, and slight swelling at the site. Sometimes you may have no reaction where the tick bit you.  Ticks may gorge themselves for days before you find and remove them. The bites themselves aren't cause for concern. But ticks can carry and pass on illnesses such as Lyme disease and [place] spotted fever. Both diseases begin with a rash and symptoms similar to the flu. In advanced stages, these diseases can be quite serious.     A "bull's eye" rash is a common symptom of Lyme disease.   When to go to the emergency department (ED)  Not all ticks carry disease. And a tick must stay attached for at least 24 hours to infect you. If you find a tick, don't panic. Try to carefully remove it with tweezers. Grasp the tick near its head and pull without twisting. If you can't " easily dislodge the tick or if you leave the head in your skin, get medical care right away.  What to expect in the ED  · The tick or any parts of the tick will be removed and the bite will be cleaned.  · To prevent disease, you may be given antibiotics. Both Lyme disease and [place] spotted fever respond quickly to these medicines.  · You may be asked to see your healthcare provider for a blood test to check for Lyme disease.  Follow-up care  Some states and [facility] have services that test ticks for Lyme disease and other diseases. Check with your local officials to see if this service is available in your area.  If you remove a tick yourself, watch for signs of a tick-borne illness. Symptoms may show up within a few days or weeks after a bite. Call your healthcare provider if you notice any of the following:  · Rash. The rash may spread outward in a ring from a hard white lump. Or it may move up your arms and legs to your chest.  · Chills and fever  · Body aches and joint pain  · Severe headache  Date Last Reviewed: 12/1/2016  © 8292-4298 [employer]. [address] [place] [address]. All rights reserved. This information is not intended as a substitute for professional medical care. Always follow your healthcare professional's instructions.

## 2024-05-11 ENCOUNTER — OFFICE VISIT (OUTPATIENT)
Dept: URGENT CARE | Facility: CLINIC | Age: 49
End: 2024-05-11
Payer: COMMERCIAL

## 2024-05-11 VITALS
OXYGEN SATURATION: 97 % | TEMPERATURE: 97 F | DIASTOLIC BLOOD PRESSURE: 87 MMHG | RESPIRATION RATE: 19 BRPM | WEIGHT: 150.13 LBS | HEART RATE: 80 BPM | HEIGHT: 62 IN | SYSTOLIC BLOOD PRESSURE: 125 MMHG | BODY MASS INDEX: 27.63 KG/M2

## 2024-05-11 DIAGNOSIS — S69.92XA INJURY OF LEFT HAND, INITIAL ENCOUNTER: Primary | ICD-10-CM

## 2024-05-11 DIAGNOSIS — S61.412A SUPERFICIAL LACERATION OF LEFT HAND, INITIAL ENCOUNTER: ICD-10-CM

## 2024-05-11 PROCEDURE — 90715 TDAP VACCINE 7 YRS/> IM: CPT | Mod: S$GLB,,, | Performed by: PHYSICIAN ASSISTANT

## 2024-05-11 PROCEDURE — 99203 OFFICE O/P NEW LOW 30 MIN: CPT | Mod: 25,S$GLB,, | Performed by: PHYSICIAN ASSISTANT

## 2024-05-11 PROCEDURE — 90471 IMMUNIZATION ADMIN: CPT | Mod: S$GLB,,, | Performed by: PHYSICIAN ASSISTANT

## 2024-05-11 RX ORDER — MUPIROCIN 20 MG/G
OINTMENT TOPICAL 2 TIMES DAILY
Qty: 30 G | Refills: 3 | Status: SHIPPED | OUTPATIENT
Start: 2024-05-11

## 2024-05-11 RX ORDER — DOXYCYCLINE HYCLATE 100 MG
100 TABLET ORAL 2 TIMES DAILY
Qty: 20 TABLET | Refills: 0 | Status: SHIPPED | OUTPATIENT
Start: 2024-05-11 | End: 2024-05-21

## 2024-05-11 NOTE — PROGRESS NOTES
"Subjective:      Patient ID: Marin Gomes is a 49 y.o. female.    Vitals:  height is 5' 2" (1.575 m) and weight is 68.1 kg (150 lb 2.1 oz). Her oral temperature is 97.2 °F (36.2 °C). Her blood pressure is 125/87 and her pulse is 80. Her respiration is 19 and oxygen saturation is 97%.     Chief Complaint: Laceration    Patient is here today with a c/o a laceration to left hand.   Patient states it happened today around three hours ago while working in the garden, she stabbed herself with shear while trying to cut a limb.  Patient also states she rinse it off really good and went back working in the garden, she didn't use anything for it.  Patient is not on blood thinners.  Patient does not know the status of her tetanus.  Patient reports that     Laceration   The incident occurred 3 to 6 hours ago. The laceration is located on the Left hand. The laceration mechanism was a blunt object. The pain is at a severity of 6/10. The pain is moderate. The pain has been Constant since onset. She reports no foreign bodies present. Her tetanus status is unknown.       Constitution: Negative for chills, sweating, fatigue and fever.   HENT:  Negative for ear pain, drooling, congestion, sore throat, trouble swallowing and voice change.    Neck: Negative for neck pain, neck stiffness, painful lymph nodes and neck swelling.   Cardiovascular:  Negative for chest pain, leg swelling, palpitations, sob on exertion and passing out.   Eyes:  Negative for eye pain, eye redness, photophobia, double vision, blurred vision and eyelid swelling.   Respiratory:  Negative for chest tightness, cough, sputum production, bloody sputum, shortness of breath, stridor and wheezing.    Gastrointestinal:  Negative for abdominal pain, abdominal bloating, nausea, vomiting, constipation, diarrhea and heartburn.   Musculoskeletal:  Positive for joint pain, joint swelling, abnormal ROM of joint and muscle ache. Negative for back pain and muscle cramps. "   Skin:  Positive for laceration (superficial). Negative for rash and hives.   Allergic/Immunologic: Negative for seasonal allergies, food allergies, hives, itching and sneezing.   Neurological:  Positive for numbness and tingling. Negative for dizziness, light-headedness, passing out, loss of balance, headaches, altered mental status, loss of consciousness and seizures.   Hematologic/Lymphatic: Negative for swollen lymph nodes.   Psychiatric/Behavioral:  Negative for altered mental status and nervous/anxious. The patient is not nervous/anxious.       Objective:     Physical Exam   Constitutional: She is oriented to person, place, and time. She appears well-developed. She is cooperative.  Non-toxic appearance. She does not appear ill. No distress.   HENT:   Head: Normocephalic and atraumatic.   Ears:   Right Ear: External ear normal.   Left Ear: External ear normal.   Nose: Nose normal.   Mouth/Throat: Uvula is midline, oropharynx is clear and moist and mucous membranes are normal.   Eyes: Conjunctivae and lids are normal. No scleral icterus.   Neck: Trachea normal and phonation normal. Neck supple. No edema present. No erythema present. No neck rigidity present.   Cardiovascular: Normal rate, regular rhythm, normal heart sounds and normal pulses.   Pulmonary/Chest: Effort normal and breath sounds normal. No respiratory distress. She has no decreased breath sounds. She has no rhonchi.   Abdominal: Normal appearance.   Musculoskeletal:         General: No deformity.      Right hand: Normal.        Hands:    Neurological: She is alert and oriented to person, place, and time. She exhibits normal muscle tone. Coordination normal.   Skin: Skin is warm, dry, intact, not diaphoretic and not pale.   Psychiatric: Her speech is normal and behavior is normal. Judgment and thought content normal.   Nursing note and vitals reviewed.    Cleaned with sterile saline. Applied bactroban, nonstick and bandage by MA. TKO Splint placed  by MA today: NV intact. 2+ cap refill.     Assessment:     1. Injury of left hand, initial encounter    2. Superficial laceration of left hand, initial encounter      Plan:   Discussed with patient that we do not currently have XRAY on site today in clinic. Gave patient outpatient XRAY options. Will call with results. Advised close follow-up with PCP and/or Specialist for further evaluation as needed. ER precautions given to patient as well. Patient aware, verbalized understanding and agreed with plan of care.    Injury of left hand, initial encounter  -     XR HAND COMPLETE 3 VIEW LEFT; Future; Expected date: 05/11/2024  -     E - OTHER    Superficial laceration of left hand, initial encounter    Other orders  -     (In Office Administered) Tdap Vaccine  -     doxycycline (VIBRA-TABS) 100 MG tablet; Take 1 tablet (100 mg total) by mouth 2 (two) times daily. for 10 days  Dispense: 20 tablet; Refill: 0  -     mupirocin (BACTROBAN) 2 % ointment; Apply topically 2 (two) times daily.  Dispense: 30 g; Refill: 3      Patient Instructions     Patient Instructions   OUTPATIENT XRAY LOCATIONS FOR PATIENTS:  [facility] [address] [address]  [phone]  7 a.m. to 6 p.m.  Monday through Friday   7 a.m. to 1 p.m.  Saturday   Lab, X-ray, Ultrasound, CT and MRI    [address] [address] [phone]  7 a.m. to 5 p.m.  Monday - Friday   The [facility] offers a range of diagnostic services, including full-service laboratory testing and radiology and imaging services, including X-rays and Ultrasounds.

## 2024-05-11 NOTE — PATIENT INSTRUCTIONS
Patient Instructions   OUTPATIENT XRAY LOCATIONS FOR PATIENTS:  [facility]  [id number] [place] [id number]  [phone]  7 a.m. to 6 p.m.  Monday through Friday   7 a.m. to 1 p.m.  Saturday   Lab, X-ray, Ultrasound, CT and MRI    [address] [address]  [phone]  7 a.m. to 5 p.m.  Monday - Friday   The [facility] offers a range of diagnostic services, including full-service laboratory testing and radiology and imaging services, including X-rays and Ultrasounds.

## 2024-05-13 ENCOUNTER — TELEPHONE (OUTPATIENT)
Dept: URGENT CARE | Facility: CLINIC | Age: 49
End: 2024-05-13
Payer: COMMERCIAL

## 2024-05-13 NOTE — TELEPHONE ENCOUNTER
Pt was called and informed there was no fracture seen on left hand x-ray. Pt had no further questions.